# Patient Record
Sex: FEMALE | Race: WHITE
[De-identification: names, ages, dates, MRNs, and addresses within clinical notes are randomized per-mention and may not be internally consistent; named-entity substitution may affect disease eponyms.]

---

## 2023-08-25 ENCOUNTER — HOSPITAL ENCOUNTER (OUTPATIENT)
Dept: HOSPITAL 47 - RADXRMAIN | Age: 38
Discharge: HOME | End: 2023-08-25
Attending: FAMILY MEDICINE
Payer: COMMERCIAL

## 2023-08-25 DIAGNOSIS — M19.012: Primary | ICD-10-CM

## 2023-08-25 DIAGNOSIS — I10: ICD-10-CM

## 2023-08-25 DIAGNOSIS — J94.8: ICD-10-CM

## 2023-08-25 DIAGNOSIS — S46.012A: ICD-10-CM

## 2023-08-25 DIAGNOSIS — F43.12: ICD-10-CM
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
## 2023-08-25 PROCEDURE — 84439 ASSAY OF FREE THYROXINE: CPT

## 2023-08-25 PROCEDURE — 80053 COMPREHEN METABOLIC PANEL: CPT

## 2023-08-25 PROCEDURE — 84443 ASSAY THYROID STIM HORMONE: CPT

## 2023-08-25 PROCEDURE — 86480 TB TEST CELL IMMUN MEASURE: CPT

## 2023-08-25 PROCEDURE — 85025 COMPLETE CBC W/AUTO DIFF WBC: CPT

## 2023-08-25 NOTE — XR
EXAMINATION TYPE: XR shoulder complete LT

 

DATE OF EXAM: 8/25/2023

 

COMPARISON: NONE

 

HISTORY: Pain

 

TECHNIQUE: Three views are submitted.

 

FINDINGS:

The osseous structures are intact.  There is no acute fracture or dislocation. AC joint arthropathy..
  

 

IMPRESSION:

1. Mild AC joint arthropathy..

## 2023-08-26 LAB
ALBUMIN SERPL-MCNC: 4.6 D/DL (ref 3.8–4.9)
ALBUMIN/GLOB SERPL: 1.84 RATIO (ref 1.6–3.17)
ALP SERPL-CCNC: 82 U/L (ref 41–126)
ALT SERPL-CCNC: 23 U/L (ref 8–44)
ANION GAP SERPL CALC-SCNC: 11.9 MMOL/L (ref 4–12)
AST SERPL-CCNC: 16 U/L (ref 13–35)
BASOPHILS # BLD AUTO: 0.04 X 10*3/UL (ref 0–0.1)
BASOPHILS NFR BLD AUTO: 0.3 %
BUN SERPL-SCNC: 7.9 MG/DL (ref 9–27)
BUN/CREAT SERPL: 9.88 RATIO (ref 12–20)
CALCIUM SPEC-MCNC: 10.1 MG/DL (ref 8.7–10.3)
CHLORIDE SERPL-SCNC: 104 MMOL/L (ref 96–109)
CO2 SERPL-SCNC: 24.1 MMOL/L (ref 21.6–31.8)
EOSINOPHIL # BLD AUTO: 0.19 X 10*3/UL (ref 0.04–0.35)
EOSINOPHIL NFR BLD AUTO: 1.5 %
ERYTHROCYTE [DISTWIDTH] IN BLOOD BY AUTOMATED COUNT: 4.79 X 10*6/UL (ref 4.1–5.2)
ERYTHROCYTE [DISTWIDTH] IN BLOOD: 13.4 % (ref 11.5–14.5)
GLOBULIN SER CALC-MCNC: 2.5 D/DL (ref 1.6–3.3)
GLUCOSE SERPL-MCNC: 87 MG/DL (ref 70–110)
HCT VFR BLD AUTO: 43.4 % (ref 37.2–46.3)
HGB BLD-MCNC: 14 D/DL (ref 12–15)
IMM GRANULOCYTES BLD QL AUTO: 0.5 %
LYMPHOCYTES # SPEC AUTO: 3.04 X 10*3/UL (ref 0.9–5)
LYMPHOCYTES NFR SPEC AUTO: 24.3 %
MCH RBC QN AUTO: 29.2 PG (ref 27–32)
MCHC RBC AUTO-ENTMCNC: 32.3 D/DL (ref 32–37)
MCV RBC AUTO: 90.6 FL (ref 80–97)
MONOCYTES # BLD AUTO: 0.64 X 10*3/UL (ref 0.2–1)
MONOCYTES NFR BLD AUTO: 5.1 %
NEUTROPHILS # BLD AUTO: 8.52 X 10*3/UL (ref 1.8–7.7)
NEUTROPHILS NFR BLD AUTO: 68.3 %
NRBC BLD AUTO-RTO: 0 X 10*3/UL (ref 0–0.01)
PLATELET # BLD AUTO: 360 X 10*3/UL (ref 140–440)
POTASSIUM SERPL-SCNC: 4.4 MMOL/L (ref 3.5–5.5)
PROT SERPL-MCNC: 7.1 D/DL (ref 6.2–8.2)
SODIUM SERPL-SCNC: 140 MMOL/L (ref 135–145)
T4 FREE SERPL-MCNC: 1.53 NG/DL (ref 0.8–1.8)
WBC # BLD AUTO: 12.49 X 10*3/UL (ref 4.5–10)

## 2023-12-27 ENCOUNTER — HOSPITAL ENCOUNTER (OUTPATIENT)
Dept: HOSPITAL 47 - BARWHC3 | Age: 38
End: 2023-12-27
Attending: SURGERY
Payer: COMMERCIAL

## 2023-12-27 VITALS — DIASTOLIC BLOOD PRESSURE: 81 MMHG | SYSTOLIC BLOOD PRESSURE: 144 MMHG | HEART RATE: 116 BPM

## 2023-12-27 VITALS — RESPIRATION RATE: 16 BRPM | TEMPERATURE: 98.1 F

## 2023-12-27 VITALS — BODY MASS INDEX: 47 KG/M2

## 2023-12-27 DIAGNOSIS — I47.10: ICD-10-CM

## 2023-12-27 DIAGNOSIS — E66.01: Primary | ICD-10-CM

## 2023-12-27 DIAGNOSIS — F98.8: ICD-10-CM

## 2023-12-27 DIAGNOSIS — F32.A: ICD-10-CM

## 2023-12-27 DIAGNOSIS — F42.9: ICD-10-CM

## 2023-12-27 DIAGNOSIS — J45.909: ICD-10-CM

## 2023-12-27 DIAGNOSIS — F41.1: ICD-10-CM

## 2023-12-27 DIAGNOSIS — Z87.891: ICD-10-CM

## 2023-12-27 DIAGNOSIS — F90.9: ICD-10-CM

## 2023-12-27 DIAGNOSIS — I11.0: ICD-10-CM

## 2023-12-27 DIAGNOSIS — Z98.890: ICD-10-CM

## 2023-12-27 DIAGNOSIS — K76.89: ICD-10-CM

## 2023-12-27 DIAGNOSIS — K90.0: ICD-10-CM

## 2023-12-27 DIAGNOSIS — Z91.018: ICD-10-CM

## 2023-12-27 DIAGNOSIS — R13.10: ICD-10-CM

## 2023-12-27 PROCEDURE — 99202 OFFICE O/P NEW SF 15 MIN: CPT

## 2024-01-18 NOTE — P.HPBAR
Bariatric H&P





- History & Physicial


H&P Date: 23


History & Physicial: 


Visit/CC: New PT





Patient initial contact: 





Initial weight: 135.171 kg


Initial weight in pounds: 298.00


Height: 5 ft 6.75 in


Initial BMI: 47.0





Last weight: 





Current weight: 135.171 kg


Current weight in pounds: 298.00


Current BMI: 47.0





Ideal body weight (based on NIH guidelines): 60.668 kg





Excess body weight loss: 0.0%








The patient is a 38 year-old F who presents for Bariatric Assessment.








DATE OF SERVICE: 23





REASON FOR CONSULTATION: Initial bariatric evaluation. 





HISTORY OF PRESENT ILLNESS:  Sherly Lorenz is a 38-year-old female who comes

with lifelong morbid obesity.  He presents look into the sleeve gastrectomy.  No

prior medical supervised weight loss.  As a result of her morbid obesity, she is

developed hypertensive heart disease, osteoarthritis of the hips, knees, ankles.

 She reports having prior upper endoscopy in the past year.  She has history of 

gastroesophageal reflux disease for over a year.  She presents today at her 

highest weight.  He also presents with dysphagia.





At height of 5 feet 6.75 inches, her ideal body weight is 154 pounds.  She comes

in 298 pounds.  Her body mass index is 47.0.  She is 144 pounds overweight.





PAST MEDICAL HISTORY: 


1.  Morbid obesity due to excess calories


2.  Body mass index of 47.0


3.  Depressive disorder


4.  Generalized anxiety disorder


5.  Hypertensive heart disease


6.  ADD with ADHD


7.  Obsessive-compulsive disorder


8.  Asthma


9.  Supraventricular tachycardia


10.  Celiac disease


11.  Liver tumors


12.  Depressive disorder





PAST SURGICAL HISTORY: 


1.  Section


2. Tonsillectomy





HOME MEDICATIONS: 


                                Home Medications











 Medication  Instructions  Recorded  Confirmed


 


ALPRAZolam [Xanax] 0.5 mg PO DAILY PRN 17


 


ARIPiprazole [Abilify] 10 mg PO DAILY 23


 


Lisdexamfetamine Dimesylate 20 mg PO DAILY 23





[Vyvanse]   


 


buPROPion [Wellbutrin] 100 mg PO DAILY 23


 


carvediloL [Coreg] 3.125 mg PO DAILY 23


 


fluvoxaMINE [Luvox] 50 mg PO DAILY 23














ALLERGIES:


                                    Allergies











Allergy/AdvReac Type Severity Reaction Status Date / Time


 


gluten Allergy  Diarrhea Verified 17 13:57











SOCIAL HISTORY:April





FAMILY HISTORY: No family history of ulcerative colitis disease or Crohn's 

disease. Family history of morbid obesity. No lupus in the family.  No reports 

of stomach or esophageal cancer.  





REVIEW OF ORGAN SYSTEMS: 


CONSTITUTIONAL: At height of 5 feet 6.75 inches, her ideal body weight is 154 

pounds.  She comes in 298 pounds.  Her body mass index is 47.0.  She is 144 

pounds overweight.


HEENT: Denies any active troubles with vision or hearing.  Has troubles with 

swallowing.  


ENDOCRINE: Denies diabetes. Denies hypothyroidism. 


CARDIOVASCULAR: Past reports of palpitations or heart attacks or chest pain.  

Has hypertensive heart disease.


RESPIRATORY: Denies recent pneumonia.


GASTROINTESTINAL: Denies any bright red blood per rectum.  No diarrhea. No 

constipation.  Has celiac disease.


GENITOURINARY:  No recent blood in urine


MUSCULOSKELETAL: Has lower back pain and joint pain.  Has osteoarthritis of the 

knees. 


NEURO: No headaches. No seizure disorders. 


PSYCH: Has depression.  Has ADD with ADHD.  Has generalized anxiety disorder


RHEUMATOLOGIC: No lupus.  No rheumatoid arthritis.  


HEMATOLOGIC: Denies any abnormal bleeding or bruising.  


SKIN: No rash.  No skin cancer.





PHYSICAL EXAM: 


VITAL SIGNS: Height 5 foot 6.75 inches, weight 298 pounds. BMI 47.0


                                   Vital Signs











Temp  98.1 F   23 13:23


 


Pulse  116 H  23 13:23


 


Resp  16   23 13:23


 


BP  144/81   23 13:23


 


Pulse Ox      


 


FiO2      














GENERAL: Well-developed in no acute distress.  


HEENT: No scleral icterus.  Extraocular movements grossly intact.  Hears 

conversational speech.  No nasal drainage.


NECK: Supple without lymphadenopathy. 


CHEST: Nonlabored respirations with equal bilateral excursions. 


CARDIOVASCULAR: Distal 2+ pulses. 


ABDOMEN: Obese, soft, nontender, nondistended. 


MUSCULOSKELETAL: No clubbing, cyanosis. 


NEURO: No focal or lateralizing signs. Cranial nerves 2 through 12 grossly 

within normal limits. 


PSYCH: Appropriate affect. Alert and oriented to person, place and time.


SKIN: Good skin turgor.  Well perfused.





ASSESSMENT: 


1.  Morbid obesity due to excess calories


2.  Body mass index of 47.0


3.  Depressive disorder


4.  Generalized anxiety disorder


5.  Hypertensive heart disease


6.  ADD with ADHD


7.  Obsessive-compulsive disorder


8.  Asthma


9.  Supraventricular tachycardia


10.  Celiac disease


11.  Liver tumors


12.  Depressive disorder


13.  Dysphagia





PLAN: 


1.  Surgical options including a band, gastric bypass, sleeve gastrectomy were 

described in detail.  Alternatives such as gastric balloon including duodenal 

switch were described. She is looking into the gastric bypass.


2.  The [place] bariat[facility] surgical collaborative data and outcomes calculator 

available


3.  Recommend a bariatric metabolic panel to evaluate for micro- including 

macronutrient deficiencies. 


4.  Recommend evaluation and treatment for sleep apnea.


5.  Dietary surveillance and counseling was reviewed. Increased protein intake 

over 65 grams daily advised.


6.  Will need cardiac risk assessment.


7.  Recommend medical risk assessment.


8.  Psych assessment per insurance guidelines.


9.  Recommend upper endoscopy.


10.  Recommend 12-lead EKG.


11.  Recommend esophagram with dysphagia.


12.  Recommend urine metabolites testing





Thank you for this consultation.








Past Medical History


Past Medical History: Asthma, Hypertension


Additional Past Medical History / Comment(s): tachycardia SVT, celiac, 2 liver 

tumors


History of Any Multi-Drug Resistant Organisms: None Reported


Past Surgical History:  Section, Tonsillectomy


Additional Past Surgical History / Comment(s): colposcopy.  C section x 3


Past Anesthesia/Blood Transfusion Reactions: No Reported Reaction


Past Psychological History: ADD/ADHD, Anxiety, Depression


Smoking Status: Vaper, Unknown if ever smoked


Past Alcohol Use History: None Reported


Past Drug Use History: None Reported, Marijuana





Surgical - Exam


                                   Vital Signs











Temp Pulse Resp BP


 


 98.1 F   116 H  16   144/81 


 


 23 13:23  23 13:23  23 13:23  23 13:23














Bariatric Checklist


Checklist: 


Plan: 





Checklist: 





EGD: 


1. Hiatal hernia: 


2. H. Pylori: 





HgbA1c: 





Vitamin D: 





Smoking: Former smoker





Primary care physician referral: Lynne





Psychiatry clearance: 





Cardiology clearance: 





Sleep study: 





Diet journal: 





VTE risk score: 





VTE risk level: 





Rehab needs at discharge: